# Patient Record
Sex: MALE | Race: WHITE | NOT HISPANIC OR LATINO | Employment: FULL TIME | URBAN - METROPOLITAN AREA
[De-identification: names, ages, dates, MRNs, and addresses within clinical notes are randomized per-mention and may not be internally consistent; named-entity substitution may affect disease eponyms.]

---

## 2019-02-23 ENCOUNTER — HOSPITAL ENCOUNTER (OUTPATIENT)
Dept: URGENT CARE | Facility: CLINIC | Age: 54
Discharge: HOME | End: 2019-02-23

## 2019-02-25 LAB — BACTERIA SPEC AEROBE CULT: NORMAL

## 2019-11-11 ENCOUNTER — HOSPITAL ENCOUNTER (OUTPATIENT)
Dept: URGENT CARE | Facility: CLINIC | Age: 54
Discharge: HOME | End: 2019-11-11

## 2019-11-11 LAB — MONONUCLEOSIS TEST, QUAL: NEGATIVE

## 2019-11-13 LAB — BACTERIA SPEC AEROBE CULT: NORMAL

## 2020-02-27 ENCOUNTER — HOSPITAL ENCOUNTER (OUTPATIENT)
Dept: URGENT CARE | Facility: CLINIC | Age: 55
Discharge: HOME | End: 2020-02-27
Attending: NURSE PRACTITIONER

## 2020-02-29 LAB — BACTERIA SPEC AEROBE CULT: NORMAL

## 2024-05-13 ENCOUNTER — TRANSCRIBE ORDERS (OUTPATIENT)
Dept: PHYSICAL THERAPY | Facility: CLINIC | Age: 59
End: 2024-05-13
Payer: COMMERCIAL

## 2024-05-13 DIAGNOSIS — M20.012 MALLET FINGER OF LEFT HAND: Primary | ICD-10-CM

## 2024-05-21 ENCOUNTER — TREATMENT (OUTPATIENT)
Dept: PHYSICAL THERAPY | Facility: CLINIC | Age: 59
End: 2024-05-21
Payer: COMMERCIAL

## 2024-05-21 DIAGNOSIS — M20.012 MALLET FINGER OF LEFT HAND: Primary | ICD-10-CM

## 2024-05-21 DIAGNOSIS — M79.645 PAIN OF FINGER OF LEFT HAND: ICD-10-CM

## 2024-05-21 PROCEDURE — 97165 OT EVAL LOW COMPLEX 30 MIN: CPT | Performed by: OCCUPATIONAL THERAPIST

## 2024-05-21 PROCEDURE — 97110 THERAPEUTIC EXERCISES: CPT | Performed by: OCCUPATIONAL THERAPIST

## 2024-05-21 NOTE — PROGRESS NOTES
Outpatient Occupational Therapy Ortho Initial Evaluation  [address] [place] [id number]    Patient: Dylon Rivera   : 1965  Referring practitioner: Ian Amaro*  Date of Initial Visit: 2024  Today's Date: 2024  Patient seen for 1 sessions  Diagnosis/ICD-10 Code:      ICD-10-CM ICD-9-CM   1. Mallet finger of left hand  M20.012 736.1   2. Pain of finger of left hand  M79.645 729.5                Subjective Questionnaire: QuickDASH: 17      Subjective Evaluation    History of Present Illness  Date of onset: 3/13/2024  Mechanism of injury: 3/13/24 playing football, got hit in the L hand causing L 3rd digit mallet finger.   Pt was splinted at urgent care and referred to Dr. Padilla.   Treated conservatively with stax splint.  Pt was released from stax splint 5/10/24 to be worn at night for 4 more weeks      Patient Occupation: typing Quality of life: excellent    Pain  Current pain ratin  At worst pain ratin  Location: top of finger  Quality: dull ache  Aggravating factors: keyboarding and lifting  Progression: improved    Social Support  Lives with: alone (teenage kids)    Hand dominance: right    Diagnostic Tests  X-ray: normal    Treatments  Previous treatment: immobilization  Patient Goals  Patient goals for therapy: decreased edema, increased motion, increased strength, return to work, return to sport/leisure activities, decreased pain and independence with ADLs/IADLs           Past Medical hx:no significant medical hx    Objective          Active Range of Motion     Additional Active Range of Motion Details  L LF  0-90  -10-90  15-60    235 CHANG     Strength/Myotome Testing     Left Wrist/Hand      (2nd hand position)   Left  strength (2nd hand position) 81 lbs    Right Wrist/Hand      (2nd hand position)   Right  strength (2nd hand position) 100 lbs    Swelling     Right Wrist/Hand   Middle     Middle: 6 cm          Assessment & Plan        Assessment  Impairments: abnormal coordination, abnormal muscle firing, abnormal or restricted ROM, activity intolerance, impaired physical strength, lacks appropriate home exercise program, pain with function, safety issue and weight-bearing intolerance   Functional limitations: carrying objects, lifting, pulling, pushing, reaching behind back, reaching overhead and unable to perform repetitive tasks   Assessment details: Pt presents with mild stiffness in LLF.  Pt having some hypersensitivity along dorsum of LF, Slight DIP extension lag, and decreased strength.  Pt reports having difficulty peeling oranges, opening jars, gripping, throwing ball.   Prognosis: good    Goals  Plan Goals: 1. The patient complains of pain in the LLF                  LTG 1: 12 weeks:  The patient will report a pain rating of 0/10 or better in order to improve sleep quality and tolerance to performance of activities of daily living.                                  STATUS:  New                  STG 1a: 4weeks:  The patient will report a pain rating of 1/10 or better.                                   STATUS:  New  2. The patient has limited ROM of the L LF                  LTG 2: 12 weeks:  The patient will demonstrate 240 degrees of CHANG with no more than 5 degrees DIP ext lag to allow the patient to type.                                  STATUS:  New                   STG 2a: 4 weeks:  The patient will demonstrate no more than 10 degrees of DIP ext lag .                                  STATUS:  New                             3. The patient has limited strength of the L UE.                  LTG 3: 12 weeks:  The patient will demonstrate 80# in order to lifting weights.                                  STATUS:  New                  STG 3a: 4 weeks:  The patient will demonstrate strength to peel orange without pain.                                  STATUS:  New  4. Carrying, Moving, and Handling Objects Functional Limitation                                     LTG 4: 12 weeks:  The patient will demonstrate 0% limitation by achieving a score of 11 on the Quick DASH.                                  STATUS:  New                  STG 4a: 4 weeks:  The patient will demonstrate 1-19% limitation by achieving a score of 13 on the Quick DASH.                                    STATUS:  New                    TREATMENT: Orthotic fabrication/fitting/management and training, Manual therapy, therapeutic exercise, home exercise instruction, and modalities as needed to include: electrical stimulation, ultrasound, moist heat, paraffin, fluidotherapy, dry needling, and ice.       Plan  Planned modality interventions: TENS, thermotherapy (hydrocollator packs), thermotherapy (paraffin bath), ultrasound and electrical stimulation/Russian stimulation  Other planned modality interventions: fluidotherapy, dry needling  Planned therapy interventions: manual therapy, ADL retraining, motor coordination training, neuromuscular re-education, soft tissue mobilization, fine motor coordination training, body mechanics training, balance/weight-bearing training, functional ROM exercises, flexibility, spinal/joint mobilization, strengthening, stretching, therapeutic activities, IADL retraining, joint mobilization and home exercise program  Frequency: 2x week  Duration in weeks: 12  Treatment plan discussed with: patient        Patient is indicated for skilled occupational therapy services.    History # of Personal Factors and/or Comorbidities: LOW (0)  Examination of Body System(s): # of elements: LOW (1-2)  Clinical Presentation: EVOLVING  Clinical Decision Making: LOW      Evaluation:  Low Complexity:    25     mins  98267;  Mod Complexity:    0     mins  50051;  High Complexity:    0     mins  19872;    Timed:  Manual Therapy:    0     mins  77476;  Therapeutic Exercise:    10     mins  03189;  Therapeutic Activity:    0     mins  58782;     Neuromuscular Mingo:    0    mins   10077;    Ultrasound:     0     mins  48794;    Orthotic mgmt:    10     mins  63308;    Untimed:  Electrical Stimulation:    0     mins  36674 ( );  Fluidotherapy:        0    mins  89104  Dry Needlin    mins      Timed Treatment:   20   mins   Total Treatment:     45   mins      OT SIGNATURE: CHRISTIANA Morales, OTR/L, CHT     Electronically signed    [place] LICENSE: [id number]   DATE TREATMENT INITIATED: 2024    Initial Certification  Certification Period: 2024 thru 2024  I certify that the therapy services are furnished while this patient is under my care.  The services outlined above are required by this patient, and will be reviewed every 90 days.     Signature:______________________________________________ PHYSICIAN:  Ian Padilla MD   NPI: [id number]                                      DATE:    Please sign and return via fax to [phone]   Thank you, [facility].

## 2024-05-30 ENCOUNTER — TREATMENT (OUTPATIENT)
Dept: PHYSICAL THERAPY | Facility: CLINIC | Age: 59
End: 2024-05-30
Payer: COMMERCIAL

## 2024-05-30 DIAGNOSIS — M79.645 PAIN OF FINGER OF LEFT HAND: ICD-10-CM

## 2024-05-30 DIAGNOSIS — M20.012 MALLET FINGER OF LEFT HAND: Primary | ICD-10-CM

## 2024-05-30 NOTE — PROGRESS NOTES
Occupational Therapy Daily Treatment Note   [address] [place] [address]    Patient: Dylon Rivera   : 1965  Referring practitioner: Ian Amaro*  Date of Initial Visit: Type: THERAPY  Noted: 2024  Today's Date: 2024  Patient seen for 2 sessions    ICD-10-CM ICD-9-CM   1. Mallet finger of left hand  M20.012 736.1   2. Pain of finger of left hand  M79.645 729.5          Dylon Rivera reports I think it is a little better now.       Functional status typing is getting easier.     Objective   See Exercise, Manual, and Modality Logs for complete treatment.   L LF  0-90  -3-90  10-60    Assessment/Plan  Increased functional use of L LF with improved mobility.       Cont per POC           Timed:  Manual Therapy:    0     mins  25657;  Therapeutic Exercise:    10     mins  69143;  Therapeutic Activity:    10     mins  53394;     Neuromuscular Mingo:    10    mins  34563;    Ultrasound:     0     mins  53079;    Electrical Stimulation:    0     mins  04191;    Untimed:  Electrical Stimulation:    0     mins  19274 ( );  Fluidotherapy:        0    mins  93656  Dry Needlin    mins  24337    Timed Treatment:   30   mins   Total Treatment:     30   mins    OT SIGNATURE: CHRISTIANA Morales, KAYLIN/L, CHT     Electronically signed    [place] LICENSE: [id number]

## 2024-06-06 ENCOUNTER — TREATMENT (OUTPATIENT)
Dept: PHYSICAL THERAPY | Facility: CLINIC | Age: 59
End: 2024-06-06
Payer: COMMERCIAL

## 2024-06-06 DIAGNOSIS — M79.645 PAIN OF FINGER OF LEFT HAND: ICD-10-CM

## 2024-06-06 DIAGNOSIS — M20.012 MALLET FINGER OF LEFT HAND: Primary | ICD-10-CM

## 2024-06-06 NOTE — PROGRESS NOTES
Occupational Therapy Daily Treatment Note   [address] [place] [id number]    Patient: Dylon Rivera   : 1965  Referring practitioner: Ian Amaro*  Date of Initial Visit: Type: THERAPY  Noted: 2024  Today's Date: 2024  Patient seen for 3 sessions    ICD-10-CM ICD-9-CM   1. Mallet finger of left hand  M20.012 736.1   2. Pain of finger of left hand  M79.645 729.5          Dylon Rivera reports It is just stiff today especially making into a fist.       Functional status     Objective   See Exercise, Manual, and Modality Logs for complete treatment.   L LF  0-90  0-90  15-75    Kinesiotape applied to L LF for support of DIP extension and edema control.  Pt in fisted position narrow I strip x2 wrapped alternating around L LF to draw edema out of PIP  joint and dorsal P2.  Y strip attached Proximal to MP joint dorsally and pulled distally with 50% while patient in fisted position to support DIP extension.       Assessment/Plan    Wean from stax splint 24, educated transition out of brace and watch for extension lag of DIP.    Cont per POC           Timed:  Manual Therapy:    0     mins  08435;  Therapeutic Exercise:    10     mins  45646;  Therapeutic Activity:    10     mins  70721;     Neuromuscular Mingo:    10    mins  97590;    Ultrasound:     0     mins  68903;    Electrical Stimulation:    0     mins  51110;    Untimed:  Electrical Stimulation:    0     mins  40004 ( );  Fluidotherapy:        0    mins  01206  Dry Needlin    mins  85750    Timed Treatment:   30   mins   Total Treatment:     30   mins    OT SIGNATURE: CHRISTIANA Morales, OTR/L, CHT     Electronically signed    [place] LICENSE: [id number]

## 2024-06-13 ENCOUNTER — TREATMENT (OUTPATIENT)
Dept: PHYSICAL THERAPY | Facility: CLINIC | Age: 59
End: 2024-06-13
Payer: COMMERCIAL

## 2024-06-13 DIAGNOSIS — M20.012 MALLET FINGER OF LEFT HAND: Primary | ICD-10-CM

## 2024-06-13 DIAGNOSIS — M79.645 PAIN OF FINGER OF LEFT HAND: ICD-10-CM

## 2024-06-13 NOTE — PROGRESS NOTES
Occupational Therapy Daily Treatment Note  [address] [facility], [address]   [place]  [address]      Patient: Dylon Rivera   : 1965  Referring practitioner: Ian Amaro*  Date of Initial Visit: Type: THERAPY  Noted: 2024  Today's Date: 2024  Patient seen for 4 sessions           Subjective Evaluation    History of Present Illness    Subjective comment: Functional status:  Able to perform all personal, household and avocational activities.  Now back to lifting weights.Pain  Current pain ratin  Quality: discomfort             Objective          Active Range of Motion     Left Digits    Extension   Middle     DIP: -18 degrees      See Exercise, Manual, and Modality Logs for complete treatment.       Assessment & Plan       Assessment  Assessment details: Good tolerance to exercises.  3 degree decrease in active DIP extension in L LF.  Patient encouraged to wear his mallet splint at night.  Continue with plan of care.        Visit Diagnoses:    ICD-10-CM ICD-9-CM   1. Mallet finger of left hand  M20.012 736.1   2. Pain of finger of left hand  M79.645 729.5       Progress strengthening /stabilization /functional activity           Timed:  Manual Therapy:                 0     mins  35782  Therapeutic Exercise:      10     mins  77578     Neuromuscular reeducation       5     mins 96395  Therapeutic Activity              10     mins  43122  Ultrasound:                  0     mins  19830     Untimed:  Electrical Stimulation:    0     mins  34577 ( );  Fluidotherapy      0     mins  48039    Timed Treatment:   25   mins   Total Treatment:     25   mins    Erinn Saeed OT, IVÁNT  Occupational Therapist    Electronically signed      [place] license #: [id number]

## 2024-06-24 ENCOUNTER — TREATMENT (OUTPATIENT)
Dept: PHYSICAL THERAPY | Facility: CLINIC | Age: 59
End: 2024-06-24
Payer: COMMERCIAL

## 2024-06-24 DIAGNOSIS — M20.012 MALLET FINGER OF LEFT HAND: Primary | ICD-10-CM

## 2024-06-24 DIAGNOSIS — M79.645 PAIN OF FINGER OF LEFT HAND: ICD-10-CM

## 2024-06-24 PROCEDURE — 97530 THERAPEUTIC ACTIVITIES: CPT | Performed by: OCCUPATIONAL THERAPIST

## 2024-06-24 PROCEDURE — 97112 NEUROMUSCULAR REEDUCATION: CPT | Performed by: OCCUPATIONAL THERAPIST

## 2024-06-24 PROCEDURE — 97110 THERAPEUTIC EXERCISES: CPT | Performed by: OCCUPATIONAL THERAPIST

## 2024-06-24 NOTE — PROGRESS NOTES
Occupational Therapy Daily Treatment Note   [address] [place] [address]    Patient: Dylon Rivera   : 1965  Referring practitioner: Ian Amaro*  Date of Initial Visit: Type: THERAPY  Noted: 2024  Today's Date: 2024  Patient seen for 5 sessions    ICD-10-CM ICD-9-CM   1. Mallet finger of left hand  M20.012 736.1   2. Pain of finger of left hand  M79.645 729.5          Dylon Rivera reports I am using it at work       Functional status  able to type and use my hand normally at work.     Objective   See Exercise, Manual, and Modality Logs for complete treatment.   0-90  0-90  10-60    Assessment/Plan  Discussed transition to HEP.  Pt feels he can continue with HEP I'ly.  Reviewed splint night time schedule.   D/C to HEP           Timed:  Manual Therapy:    0     mins  79788;  Therapeutic Exercise:    9     mins  46889;  Therapeutic Activity:    8     mins  09373;     Neuromuscular Mingo:    8    mins  53290;    Ultrasound:     0     mins  23246;    Electrical Stimulation:    0     mins  83781;    Untimed:  Electrical Stimulation:    0     mins  45147 ( );  Fluidotherapy:        0    mins  13971  Dry Needlin    mins  98332    Timed Treatment:   25   mins   Total Treatment:     25   mins    OT SIGNATURE: CHRISTIANA Morales, OTR/L, CHT     Electronically signed    [place] LICENSE: [id number]

## 2025-07-17 ENCOUNTER — HOSPITAL ENCOUNTER (EMERGENCY)
Facility: HOSPITAL | Age: 60
Discharge: OTHER INSTITUTION | End: 2025-07-17
Attending: EMERGENCY MEDICINE
Payer: OTHER GOVERNMENT

## 2025-07-17 ENCOUNTER — APPOINTMENT (OUTPATIENT)
Dept: GENERAL RADIOLOGY | Facility: HOSPITAL | Age: 60
End: 2025-07-17
Payer: OTHER GOVERNMENT

## 2025-07-17 VITALS
OXYGEN SATURATION: 100 % | RESPIRATION RATE: 18 BRPM | BODY MASS INDEX: 34.85 KG/M2 | WEIGHT: 248.9 LBS | HEIGHT: 71 IN | DIASTOLIC BLOOD PRESSURE: 87 MMHG | SYSTOLIC BLOOD PRESSURE: 152 MMHG | HEART RATE: 52 BPM | TEMPERATURE: 98.1 F

## 2025-07-17 DIAGNOSIS — I1A.0 RESISTANT HYPERTENSION: Primary | ICD-10-CM

## 2025-07-17 DIAGNOSIS — R01.1 NEWLY RECOGNIZED HEART MURMUR: ICD-10-CM

## 2025-07-17 DIAGNOSIS — N18.32 STAGE 3B CHRONIC KIDNEY DISEASE: ICD-10-CM

## 2025-07-17 LAB
ALBUMIN SERPL-MCNC: 4.7 G/DL (ref 3.5–5.2)
ALBUMIN/GLOB SERPL: 1.8 G/DL
ALP SERPL-CCNC: 81 U/L (ref 39–117)
ALT SERPL W P-5'-P-CCNC: 17 U/L (ref 1–41)
ANION GAP SERPL CALCULATED.3IONS-SCNC: 13.2 MMOL/L (ref 5–15)
AST SERPL-CCNC: 14 U/L (ref 1–40)
BACTERIA UR QL AUTO: ABNORMAL /HPF
BASOPHILS # BLD AUTO: 0.06 10*3/MM3 (ref 0–0.2)
BASOPHILS NFR BLD AUTO: 0.8 % (ref 0–1.5)
BILIRUB SERPL-MCNC: 0.4 MG/DL (ref 0–1.2)
BILIRUB UR QL STRIP: NEGATIVE
BUN SERPL-MCNC: 27.3 MG/DL (ref 6–20)
BUN/CREAT SERPL: 14.7 (ref 7–25)
CALCIUM SPEC-SCNC: 9.2 MG/DL (ref 8.6–10.5)
CHLORIDE SERPL-SCNC: 103 MMOL/L (ref 98–107)
CK MB SERPL-CCNC: 1.72 NG/ML
CK SERPL-CCNC: 99 U/L (ref 20–200)
CLARITY UR: ABNORMAL
CO2 SERPL-SCNC: 25.8 MMOL/L (ref 22–29)
COLOR UR: YELLOW
CREAT SERPL-MCNC: 1.86 MG/DL (ref 0.76–1.27)
D-LACTATE SERPL-SCNC: 0.7 MMOL/L (ref 0.5–2)
DEPRECATED RDW RBC AUTO: 39.6 FL (ref 37–54)
EGFRCR SERPLBLD CKD-EPI 2021: 41.2 ML/MIN/1.73
EOSINOPHIL # BLD AUTO: 0.23 10*3/MM3 (ref 0–0.4)
EOSINOPHIL NFR BLD AUTO: 3 % (ref 0.3–6.2)
ERYTHROCYTE [DISTWIDTH] IN BLOOD BY AUTOMATED COUNT: 13.3 % (ref 12.3–15.4)
FLUAV RNA RESP QL NAA+PROBE: NOT DETECTED
FLUBV RNA NPH QL NAA+NON-PROBE: NOT DETECTED
GEN 5 1HR TROPONIN T REFLEX: 13 NG/L
GLOBULIN UR ELPH-MCNC: 2.6 GM/DL
GLUCOSE SERPL-MCNC: 104 MG/DL (ref 65–99)
GLUCOSE UR STRIP-MCNC: NEGATIVE MG/DL
HCT VFR BLD AUTO: 39 % (ref 37.5–51)
HGB BLD-MCNC: 14 G/DL (ref 13–17.7)
HGB UR QL STRIP.AUTO: NEGATIVE
HOLD SPECIMEN: NORMAL
HOLD SPECIMEN: NORMAL
HYALINE CASTS UR QL AUTO: ABNORMAL /LPF
IMM GRANULOCYTES # BLD AUTO: 0.02 10*3/MM3 (ref 0–0.05)
IMM GRANULOCYTES NFR BLD AUTO: 0.3 % (ref 0–0.5)
KETONES UR QL STRIP: ABNORMAL
LEUKOCYTE ESTERASE UR QL STRIP.AUTO: ABNORMAL
LYMPHOCYTES # BLD AUTO: 2.44 10*3/MM3 (ref 0.7–3.1)
LYMPHOCYTES NFR BLD AUTO: 32.3 % (ref 19.6–45.3)
MAGNESIUM SERPL-MCNC: 2.3 MG/DL (ref 1.6–2.6)
MCH RBC QN AUTO: 29.5 PG (ref 26.6–33)
MCHC RBC AUTO-ENTMCNC: 35.9 G/DL (ref 31.5–35.7)
MCV RBC AUTO: 82.3 FL (ref 79–97)
MONOCYTES # BLD AUTO: 0.49 10*3/MM3 (ref 0.1–0.9)
MONOCYTES NFR BLD AUTO: 6.5 % (ref 5–12)
NEUTROPHILS NFR BLD AUTO: 4.32 10*3/MM3 (ref 1.7–7)
NEUTROPHILS NFR BLD AUTO: 57.1 % (ref 42.7–76)
NITRITE UR QL STRIP: NEGATIVE
NRBC BLD AUTO-RTO: 0 /100 WBC (ref 0–0.2)
NT-PROBNP SERPL-MCNC: 345.4 PG/ML (ref 0–900)
PH UR STRIP.AUTO: 6 [PH] (ref 5–8)
PLATELET # BLD AUTO: 245 10*3/MM3 (ref 140–450)
PMV BLD AUTO: 11.1 FL (ref 6–12)
POTASSIUM SERPL-SCNC: 2.9 MMOL/L (ref 3.5–5.2)
PROT SERPL-MCNC: 7.3 G/DL (ref 6–8.5)
PROT UR QL STRIP: ABNORMAL
QT INTERVAL: 471 MS
QTC INTERVAL: 434 MS
RBC # BLD AUTO: 4.74 10*6/MM3 (ref 4.14–5.8)
RBC # UR STRIP: ABNORMAL /HPF
REF LAB TEST METHOD: ABNORMAL
RSV RNA RESP QL NAA+PROBE: NOT DETECTED
SARS-COV-2 RNA RESP QL NAA+PROBE: NOT DETECTED
SODIUM SERPL-SCNC: 142 MMOL/L (ref 136–145)
SP GR UR STRIP: 1.01 (ref 1–1.03)
SQUAMOUS #/AREA URNS HPF: ABNORMAL /HPF
TROPONIN T NUMERIC DELTA: -3 NG/L
TROPONIN T SERPL HS-MCNC: 16 NG/L
UROBILINOGEN UR QL STRIP: ABNORMAL
WBC # UR STRIP: ABNORMAL /HPF
WBC NRBC COR # BLD AUTO: 7.56 10*3/MM3 (ref 3.4–10.8)
WHOLE BLOOD HOLD COAG: NORMAL
WHOLE BLOOD HOLD SPECIMEN: NORMAL

## 2025-07-17 PROCEDURE — 71045 X-RAY EXAM CHEST 1 VIEW: CPT

## 2025-07-17 PROCEDURE — 85025 COMPLETE CBC W/AUTO DIFF WBC: CPT | Performed by: EMERGENCY MEDICINE

## 2025-07-17 PROCEDURE — 87637 SARSCOV2&INF A&B&RSV AMP PRB: CPT

## 2025-07-17 PROCEDURE — 83735 ASSAY OF MAGNESIUM: CPT

## 2025-07-17 PROCEDURE — 25810000003 SODIUM CHLORIDE 0.9 % SOLUTION

## 2025-07-17 PROCEDURE — 81001 URINALYSIS AUTO W/SCOPE: CPT

## 2025-07-17 PROCEDURE — 99285 EMERGENCY DEPT VISIT HI MDM: CPT

## 2025-07-17 PROCEDURE — 93005 ELECTROCARDIOGRAM TRACING: CPT | Performed by: EMERGENCY MEDICINE

## 2025-07-17 PROCEDURE — 80053 COMPREHEN METABOLIC PANEL: CPT | Performed by: EMERGENCY MEDICINE

## 2025-07-17 PROCEDURE — 93005 ELECTROCARDIOGRAM TRACING: CPT

## 2025-07-17 PROCEDURE — 84484 ASSAY OF TROPONIN QUANT: CPT | Performed by: EMERGENCY MEDICINE

## 2025-07-17 PROCEDURE — 36415 COLL VENOUS BLD VENIPUNCTURE: CPT

## 2025-07-17 PROCEDURE — 82553 CREATINE MB FRACTION: CPT

## 2025-07-17 PROCEDURE — 83880 ASSAY OF NATRIURETIC PEPTIDE: CPT | Performed by: EMERGENCY MEDICINE

## 2025-07-17 PROCEDURE — 83605 ASSAY OF LACTIC ACID: CPT

## 2025-07-17 PROCEDURE — 82550 ASSAY OF CK (CPK): CPT

## 2025-07-17 RX ORDER — CLONIDINE HYDROCHLORIDE 0.1 MG/1
0.1 TABLET ORAL DAILY
COMMUNITY

## 2025-07-17 RX ORDER — SODIUM CHLORIDE 0.9 % (FLUSH) 0.9 %
10 SYRINGE (ML) INJECTION AS NEEDED
Status: DISCONTINUED | OUTPATIENT
Start: 2025-07-17 | End: 2025-07-17 | Stop reason: HOSPADM

## 2025-07-17 RX ORDER — POTASSIUM CHLORIDE 750 MG/1
40 CAPSULE, EXTENDED RELEASE ORAL ONCE
Status: COMPLETED | OUTPATIENT
Start: 2025-07-17 | End: 2025-07-17

## 2025-07-17 RX ORDER — AMLODIPINE BESYLATE 10 MG/1
10 TABLET ORAL DAILY
COMMUNITY

## 2025-07-17 RX ADMIN — SODIUM CHLORIDE 1000 ML: 9 INJECTION, SOLUTION INTRAVENOUS at 15:45

## 2025-07-17 RX ADMIN — POTASSIUM CHLORIDE 40 MEQ: 750 CAPSULE, EXTENDED RELEASE ORAL at 15:47

## 2025-07-17 NOTE — ED PROVIDER NOTES
Time: 2:20 PM EDT  Date of encounter:  7/17/2025  Independent Historian/Clinical History and Information was obtained by:   Patient    History is limited by: N/A    Chief Complaint: Generalized fatigue      History of Present Illness:  Patient is a 60 y.o. year old male who presents to the emergency department for evaluation of generalized fatigue.  Patient states he has been going on for greater than 2 months however recently he started having intermittent chest pain and shortness of breath.  Patient states he feels like his legs are heavy and he is having trouble walking.  Patient does have a history of sleep apnea but has not been using his CPAP at nighttime.  Patient does have a history of hypertension and takes lisinopril, clonidine, and amlodipine daily.  Patient was recently taking cyclobenzaprine for back pain but states he has not been taking that recently.  Patient does have a history of kidney disease and avoids anti-inflammatories.  Patient states when he gets the chest pain it is in the left side of his chest and feels like a heaviness.  Patient is not a smoker.  Patient states he also has been having an increase in stress in his daily life.      Patient Care Team  Primary Care Provider: Provider, No Known    Past Medical History:     No Known Allergies  Past Medical History:   Diagnosis Date    Hypertension      History reviewed. No pertinent surgical history.  History reviewed. No pertinent family history.    Home Medications:  Prior to Admission medications    Medication Sig Start Date End Date Taking? Authorizing Provider   lisinopril (PRINIVIL,ZESTRIL) 10 MG tablet Take 10 mg by mouth Daily.    Emergency, Nurse Abraham, RN        Social History:   Social History     Tobacco Use    Smoking status: Never    Smokeless tobacco: Never   Vaping Use    Vaping status: Never Used   Substance Use Topics    Alcohol use: Yes    Drug use: Not Currently         Review of Systems:  Review of Systems  "  Constitutional:  Positive for fatigue. Negative for fever.   HENT:  Negative for congestion.    Eyes:  Negative for photophobia and visual disturbance.   Respiratory:  Positive for shortness of breath.    Cardiovascular:  Positive for chest pain. Negative for palpitations and leg swelling.   Gastrointestinal:  Negative for nausea and vomiting.   Endocrine: Negative for polyuria.   Genitourinary:  Negative for dysuria.   Skin:  Negative for rash.   Neurological:  Positive for dizziness. Negative for light-headedness and headaches.        Physical Exam:  /87   Pulse 52   Temp 98.1 °F (36.7 °C) (Oral)   Resp 18   Ht 180.3 cm (71\")   Wt 113 kg (248 lb 14.4 oz)   SpO2 100%   BMI 34.71 kg/m²     Physical Exam  Vitals reviewed.   Constitutional:       Appearance: Normal appearance. He is obese.   HENT:      Head: Normocephalic and atraumatic.   Eyes:      Extraocular Movements: Extraocular movements intact.      Conjunctiva/sclera: Conjunctivae normal.   Cardiovascular:      Rate and Rhythm: Bradycardia present. Rhythm regularly irregular.      Heart sounds: Murmur heard.   Pulmonary:      Effort: Pulmonary effort is normal.      Breath sounds: Normal breath sounds.   Abdominal:      General: There is no distension.   Skin:     General: Skin is warm and dry.      Coloration: Skin is not cyanotic.   Neurological:      General: No focal deficit present.      Mental Status: He is alert and oriented to person, place, and time.   Psychiatric:         Attention and Perception: Attention and perception normal.         Mood and Affect: Mood normal.                  Medical Decision Making:      Comorbidities that affect care:    Hypertension, Obesity    External Notes reviewed:    Previous Clinic Note: Seen urgent care prior to arrival      The following orders were placed and all results were independently analyzed by me:  Orders Placed This Encounter   Procedures    COVID-19, FLU A/B, RSV PCR 1 HR TAT - Swab, " Nasopharynx    XR Chest 1 View    [place] Draw    Comprehensive Metabolic Panel    BNP    High Sensitivity Troponin T    CBC Auto Differential    High Sensitivity Troponin T 1Hr    Magnesium    Urinalysis With Culture If Indicated - Urine, Clean Catch    Lactic Acid, Plasma    CK Total & CKMB    Urinalysis, Microscopic Only - Urine, Clean Catch    Undress & Gown    Continuous Pulse Oximetry    Vital Signs    ECG 12 Lead ED Triage Standing Order; SOA    CBC & Differential    Green Top (Gel)    Lavender Top    Gold Top - SST    Light Blue Top       Medications Given in the Emergency Department:  Medications   sodium chloride 0.9 % bolus 1,000 mL (0 mL Intravenous Stopped 7/17/25 1708)   potassium chloride (MICRO-K/KLOR-CON) CR capsule (40 mEq Oral Given 7/17/25 1547)        ED Course:    ED Course as of 07/17/25 2147   Thu Jul 17, 2025   1509 Comprehensive Metabolic Panel(!)  CKD and hypokalemia noted [AS]   1510 CBC & Differential(!)  The patient´s CBC that was reviewed and interpreted by me shows no abnormalities of critical concern. Of note, there is no anemia requiring a blood transfusion and the platelet count is acceptable. [AS]   1510 XR Chest 1 View  No acute infiltrates [AS]   2146 CBC & Differential(!) [AS]      ED Course User Index  [AS] Seaver, Alyce B, VON       Labs:    Lab Results (last 24 hours)       Procedure Component Value Units Date/Time    CBC & Differential [[id number]]  (Abnormal) Collected: 07/17/25 1400    Specimen: Blood from Arm, Right Updated: 07/17/25 1409    Narrative:      The following orders were created for panel order CBC & Differential.  Procedure                               Abnormality         Status                     ---------                               -----------         ------                     CBC Auto Differential[[id number]]        Abnormal            Final result                 Please view results for these tests on the individual orders.    Comprehensive  Metabolic Panel [[id number]]  (Abnormal) Collected: 07/17/25 1400    Specimen: Blood from Arm, Right Updated: 07/17/25 1433     Glucose 104 mg/dL      BUN 27.3 mg/dL      Creatinine 1.86 mg/dL      Sodium 142 mmol/L      Potassium 2.9 mmol/L      Chloride 103 mmol/L      CO2 25.8 mmol/L      Calcium 9.2 mg/dL      Total Protein 7.3 g/dL      Albumin 4.7 g/dL      ALT (SGPT) 17 U/L      AST (SGOT) 14 U/L      Alkaline Phosphatase 81 U/L      Total Bilirubin 0.4 mg/dL      Globulin 2.6 gm/dL      A/G Ratio 1.8 g/dL      BUN/Creatinine Ratio 14.7     Anion Gap 13.2 mmol/L      eGFR 41.2 mL/min/1.73     Narrative:      GFR Categories in Chronic Kidney Disease (CKD)              GFR Category          GFR (mL/min/1.73)    Interpretation  G1                    90 or greater        Normal or high (1)  G2                    60-89                Mild decrease (1)  G3a                   45-59                Mild to moderate decrease  G3b                   30-44                Moderate to severe decrease  G4                    15-29                Severe decrease  G5                    14 or less           Kidney failure    (1)In the absence of evidence of kidney disease, neither GFR category G1 or G2 fulfill the criteria for CKD.    eGFR calculation 2021 CKD-EPI creatinine equation, which does not include race as a factor    BNP [[id number]]  (Normal) Collected: 07/17/25 1400    Specimen: Blood from Arm, Right Updated: 07/17/25 1428     proBNP 345.4 pg/mL     Narrative:      This assay is used as an aid in the diagnosis of individuals suspected of having heart failure. It can be used as an aid in the diagnosis of acute decompensated heart failure (ADHF) in patients presenting with signs and symptoms of ADHF to the emergency department (ED). In addition, NT-proBNP of <300 pg/mL indicates ADHF is not likely.    Age Range Result Interpretation  NT-proBNP Concentration (pg/mL:      <50             Positive            >450                    Gray                 300-450                    Negative             <300    50-75           Positive            >900                  Gray                300-900                  Negative            <300      >75             Positive            >1800                  Gray                300-1800                  Negative            <300    High Sensitivity Troponin T [[id number]]  (Normal) Collected: 07/17/25 1400    Specimen: Blood from Arm, Right Updated: 07/17/25 1428     HS Troponin T 16 ng/L     Narrative:      High Sensitive Troponin T Reference Range:  <14.0 ng/L- Negative Female for AMI  <22.0 ng/L- Negative Male for AMI  >=14 - Abnormal Female indicating possible myocardial injury.  >=22 - Abnormal Male indicating possible myocardial injury.   Clinicians would have to utilize clinical acumen, EKG, Troponin, and serial changes to determine if it is an Acute Myocardial Infarction or myocardial injury due to an underlying chronic condition.         CBC Auto Differential [[id number]]  (Abnormal) Collected: 07/17/25 1400    Specimen: Blood from Arm, Right Updated: 07/17/25 1409     WBC 7.56 10*3/mm3      RBC 4.74 10*6/mm3      Hemoglobin 14.0 g/dL      Hematocrit 39.0 %      MCV 82.3 fL      MCH 29.5 pg      MCHC 35.9 g/dL      RDW 13.3 %      RDW-SD 39.6 fl      MPV 11.1 fL      Platelets 245 10*3/mm3      Neutrophil % 57.1 %      Lymphocyte % 32.3 %      Monocyte % 6.5 %      Eosinophil % 3.0 %      Basophil % 0.8 %      Immature Grans % 0.3 %      Neutrophils, Absolute 4.32 10*3/mm3      Lymphocytes, Absolute 2.44 10*3/mm3      Monocytes, Absolute 0.49 10*3/mm3      Eosinophils, Absolute 0.23 10*3/mm3      Basophils, Absolute 0.06 10*3/mm3      Immature Grans, Absolute 0.02 10*3/mm3      nRBC 0.0 /100 WBC     Magnesium [[id number]]  (Normal) Collected: 07/17/25 1400    Specimen: Blood from Arm, Right Updated: 07/17/25 1550     Magnesium 2.3 mg/dL     CK Total & CKMB [[id number]]  (Normal) Collected:  07/17/25 1400    Specimen: Blood from Arm, Right Updated: 07/17/25 1554     CKMB 1.72 ng/mL      Creatine Kinase 99 U/L     Narrative:      CKMB results may be falsely decreased if patient taking Biotin.    COVID-19, FLU A/B, RSV PCR 1 HR TAT - Swab, Nasopharynx [[id number]]  (Normal) Collected: 07/17/25 1441    Specimen: Swab from Nasopharynx Updated: 07/17/25 1521     COVID19 Not Detected     Influenza A PCR Not Detected     Influenza B PCR Not Detected     RSV, PCR Not Detected    High Sensitivity Troponin T 1Hr [[id number]]  (Normal) Collected: 07/17/25 1535    Specimen: Blood Updated: 07/17/25 1626     HS Troponin T 13 ng/L      Troponin T Numeric Delta -3 ng/L     Narrative:      High Sensitive Troponin T Reference Range:  <14.0 ng/L- Negative Female for AMI  <22.0 ng/L- Negative Male for AMI  >=14 - Abnormal Female indicating possible myocardial injury.  >=22 - Abnormal Male indicating possible myocardial injury.   Clinicians would have to utilize clinical acumen, EKG, Troponin, and serial changes to determine if it is an Acute Myocardial Infarction or myocardial injury due to an underlying chronic condition.         Lactic Acid, Plasma [[id number]]  (Normal) Collected: 07/17/25 1535    Specimen: Blood Updated: 07/17/25 1619     Lactate 0.7 mmol/L     Urinalysis With Culture If Indicated - Urine, Clean Catch [[id number]]  (Abnormal) Collected: 07/17/25 1548    Specimen: Urine, Clean Catch Updated: 07/17/25 1610     Color, UA Yellow     Appearance, UA Cloudy     pH, UA 6.0     Specific Gravity, UA 1.014     Glucose, UA Negative     Ketones, UA Trace     Bilirubin, UA Negative     Blood, UA Negative     Protein, UA Trace     Leuk Esterase, UA Trace     Nitrite, UA Negative     Urobilinogen, UA 0.2 E.U./dL    Narrative:      In absence of clinical symptoms, the presence of pyuria, bacteria, and/or nitrites on the urinalysis result does not correlate with infection.    Urinalysis, Microscopic Only - Urine, Clean  Catch [[id number]]  (Abnormal) Collected: 07/17/25 1548    Specimen: Urine, Clean Catch Updated: 07/17/25 1610     RBC, UA 0-2 /HPF      WBC, UA 6-10 /HPF      Comment: Urine culture not indicated.        Bacteria, UA None Seen /HPF      Squamous Epithelial Cells, UA 0-2 /HPF      Hyaline Casts, UA 7-12 /LPF      Methodology Automated Microscopy             Imaging:    XR Chest 1 View  Result Date: 7/17/2025  XR CHEST 1 VW Date of Exam: 7/17/2025 1:59 PM EDT Indication: SOA Triage Protocol Comparison: None available. Findings: There are no airspace consolidations. No pleural fluid. No pneumothorax. The pulmonary vasculature appears within normal limits. The cardiac and mediastinal silhouette appear unremarkable. No acute osseous abnormality identified.     Impression: No acute cardiopulmonary process. Electronically Signed: Char Dawn MD  7/17/2025 2:09 PM EDT  Workstation ID: [id number]        Differential Diagnosis and Discussion:    Chest Pain:  Based on the patient's signs and symptoms, I considered aortic dissection, myocardial infaction, pulmonary embolism, cardiac tamponade, pericarditis, pneumothorax, musculoskeletal chest pain and other differential diagnosis as an etiology of the patient's chest pain.   Weakness: Based on the patient's history, signs, and symptoms, the diffential diagnosis includes but is not limited to meningitis, stroke, sepsis, subarachnoid hemorrhage, intracranial bleeding, encephalitis, acute uti, dehydration, MS, myasthenia gravis, Guillan [place], migraine variant, neuromuscular disorders vertigo, electrolyte imbalance, and metabolic disorders.    PROCEDURES:    Labs were collected in the emergency department and all labs were reviewed and interpreted by me.  X-ray were performed in the emergency department and all X-ray impressions were independently interpreted by me.  An EKG was performed and the EKG was interpreted by me.  An EKG was performed and the EKG was interpreted by  supervising attending.    ECG 12 Lead ED Triage Standing Order; SOA   Preliminary Result   HEART RATE=51  bpm   RR Nabshxyg=7197  ms   NH Hryvlxlh=585  ms   P Horizontal Axis=21  deg   P Front Axis=26  deg   QRSD Mpufqlva=598  ms   QT Ybtulzni=114  ms   XHmY=254  ms   QRS Axis=-25  deg   T Wave Axis=-29  deg   - ABNORMAL ECG -   Sinus rhythm   Borderline prolonged NH interval   Probable LVH with secondary repol abnrm   Date and Time of Study:2025-07-17 14:02:34          Procedures    MDM     Amount and/or Complexity of Data Reviewed  Clinical lab tests: reviewed  Tests in the radiology section of CPT®: reviewed  Tests in the medicine section of CPT®: reviewed           Patient Care Considerations:    SEPSIS was considered but is NOT present in the emergency department as SIRS criteria is not present.      Consultants/Shared Management Plan:    SHARED VISIT: I have discussed the case with my supervising physician, Dr. Hernandez who states he agrees with plan of care. The substantive portion of the medical decision was made by the attesting physician who made or approve the management plan and will take responsibility for the patient.  Clinical findings were discussed and ultimate disposition was made in consult with supervising physician.  Transfer Provider: I have discussed the case with Dr. García at [facility] who agrees to accept the patient as a transfer.    Social Determinants of Health:    Patient is independent, reliable, and has access to care.       Disposition and Care Coordination:    Transferred: Through independent evaluation of the patient's history, physical, and imperical data, the patient meets criteria to be transferred to another hospital for evaluation/admission.        Final diagnoses:   Resistant hypertension   Newly recognized heart murmur   Stage 3b chronic kidney disease        ED Disposition       ED Disposition   Transfer to Another Facility     Condition   --    Comment   --                This medical record created using voice recognition software.             Seaver, Alyce B, APRN  07/17/25 2468

## 2025-07-30 LAB
QT INTERVAL: 471 MS
QTC INTERVAL: 434 MS